# Patient Record
Sex: FEMALE | Race: WHITE
[De-identification: names, ages, dates, MRNs, and addresses within clinical notes are randomized per-mention and may not be internally consistent; named-entity substitution may affect disease eponyms.]

---

## 2019-07-19 ENCOUNTER — HOSPITAL ENCOUNTER (EMERGENCY)
Dept: HOSPITAL 72 - EMR | Age: 59
LOS: 1 days | Discharge: HOME | End: 2019-07-20
Payer: MEDICARE

## 2019-07-19 VITALS — WEIGHT: 178 LBS | HEIGHT: 65 IN | BODY MASS INDEX: 29.66 KG/M2

## 2019-07-19 DIAGNOSIS — M54.9: Primary | ICD-10-CM

## 2019-07-19 DIAGNOSIS — Z88.0: ICD-10-CM

## 2019-07-19 PROCEDURE — 99283 EMERGENCY DEPT VISIT LOW MDM: CPT

## 2019-07-19 PROCEDURE — 96372 THER/PROPH/DIAG INJ SC/IM: CPT

## 2019-07-20 VITALS — DIASTOLIC BLOOD PRESSURE: 83 MMHG | SYSTOLIC BLOOD PRESSURE: 124 MMHG

## 2019-07-20 VITALS — SYSTOLIC BLOOD PRESSURE: 115 MMHG | DIASTOLIC BLOOD PRESSURE: 80 MMHG

## 2019-07-20 RX ADMIN — DEXAMETHASONE SODIUM PHOSPHATE ONE MG: 4 INJECTION, SOLUTION INTRA-ARTICULAR; INTRALESIONAL; INTRAMUSCULAR; INTRAVENOUS; SOFT TISSUE at 01:51

## 2019-07-20 RX ADMIN — DEXAMETHASONE SODIUM PHOSPHATE ONE MG: 4 INJECTION, SOLUTION INTRA-ARTICULAR; INTRALESIONAL; INTRAMUSCULAR; INTRAVENOUS; SOFT TISSUE at 01:15

## 2019-07-20 RX ADMIN — KETOROLAC TROMETHAMINE ONE MG: 30 INJECTION, SOLUTION INTRAMUSCULAR; INTRAVENOUS at 01:00

## 2019-07-20 NOTE — NUR
ED Nurse Note:



Patient walked in to ER c/o lower back pain 6/10. Stated that helped her friend 
to pull her up and after that having severe lower back pain. AAO x4, VSS at 
this time, skin is warm to touch.

## 2019-07-20 NOTE — NUR
ED Nurse Note:



pt states she wants to try decadron, ERMD notified, given med per ERMD order 
via IM injection, pt tolerated well.

## 2019-07-20 NOTE — NUR
ED Nurse Note:





pt cleared to be d/c per ERMD, pt discharge and aftercare instruction provided 
w/ prescription, pt education done via discussion and handout, pt advised to 
follow up with pcp or ortho specialist, or return to ed if changes in 
condition, pt vss, ambulates w/ steady gait, left w/ all belongings.

## 2019-07-22 NOTE — EMERGENCY ROOM REPORT
History of Present Illness


General


Chief Complaint:  Back Pain-No Injury


Source:  Patient





Present Illness


Allergies:  


Coded Allergies:  


     AZITHROMYCIN (Verified  Allergy, Unknown, 7/19/19)


     PENICILLINS (Verified  Allergy, Unknown, 7/19/19)





Nursing Documentation-[facility]


Past Medical History:  No History, Except For





Physical Exam





Vital Signs








  Date Time  Temp Pulse Resp B/P (MAP) Pulse Ox O2 Delivery O2 Flow Rate FiO2


 


7/19/19 23:56 98.4 74 16 124/83 (97) 98 Room Air  











Medical Decision Making


Diagnostic Impression:  


 Primary Impression:  


 Back pain





Last Vital Signs








  Date Time  Temp Pulse Resp B/P (MAP) Pulse Ox O2 Delivery O2 Flow Rate FiO2


 


7/20/19 01:59 98.2 70 16 115/80 98 Room Air  








Status:  improved


Disposition:  HOME, SELF-CARE


Condition:  Stable


Scripts


Gabapentin* (GABAPENTIN*) 400 Mg Capsule


400 MG ORAL THREE TIMES A DAY, #30 CAP 0 Refills


   Prov: Parveen Trinh MD         7/20/19


Referrals:  


[facility],REFERRING (PCP)


Patient Instructions:  Back Pain, Adult











Parveen Trinh MD Jul 22, 2019 22:00